# Patient Record
Sex: FEMALE | Race: WHITE | HISPANIC OR LATINO
[De-identification: names, ages, dates, MRNs, and addresses within clinical notes are randomized per-mention and may not be internally consistent; named-entity substitution may affect disease eponyms.]

---
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
## 2022-04-12 ENCOUNTER — TRANSCRIPTION ENCOUNTER (OUTPATIENT)
Age: 22
End: 2022-04-12

## 2022-12-29 ENCOUNTER — NON-APPOINTMENT (OUTPATIENT)
Age: 22
End: 2022-12-29

## 2023-03-03 ENCOUNTER — NON-APPOINTMENT (OUTPATIENT)
Age: 23
End: 2023-03-03

## 2023-04-21 PROBLEM — J45.909 UNSPECIFIED ASTHMA, UNCOMPLICATED: Chronic | Status: ACTIVE | Noted: 2020-07-30

## 2023-05-01 ENCOUNTER — NON-APPOINTMENT (OUTPATIENT)
Age: 23
End: 2023-05-01

## 2023-05-02 ENCOUNTER — APPOINTMENT (OUTPATIENT)
Dept: OTOLARYNGOLOGY | Facility: CLINIC | Age: 23
End: 2023-05-02
Payer: MEDICAID

## 2023-05-02 ENCOUNTER — NON-APPOINTMENT (OUTPATIENT)
Age: 23
End: 2023-05-02

## 2023-05-02 VITALS
SYSTOLIC BLOOD PRESSURE: 98 MMHG | DIASTOLIC BLOOD PRESSURE: 64 MMHG | HEIGHT: 61 IN | WEIGHT: 122 LBS | HEART RATE: 86 BPM | BODY MASS INDEX: 23.03 KG/M2

## 2023-05-02 PROCEDURE — 31231 NASAL ENDOSCOPY DX: CPT

## 2023-05-02 PROCEDURE — 99203 OFFICE O/P NEW LOW 30 MIN: CPT | Mod: 25

## 2023-05-02 RX ORDER — OMEPRAZOLE 20 MG/1
TABLET, ORALLY DISINTEGRATING, DELAYED RELEASE ORAL
Refills: 0 | Status: ACTIVE | COMMUNITY

## 2023-05-02 RX ORDER — FLUTICASONE PROPIONATE 50 UG/1
50 SPRAY, METERED NASAL TWICE DAILY
Qty: 1 | Refills: 1 | Status: ACTIVE | COMMUNITY
Start: 2023-05-02 | End: 1900-01-01

## 2023-05-02 NOTE — REASON FOR VISIT
[Initial Evaluation] : an initial evaluation for [FreeTextEntry2] : Allergies, nose pain and watery eyes

## 2023-05-02 NOTE — PHYSICAL EXAM
[Nasal Endoscopy Performed] : nasal endoscopy was performed, see procedure section for findings [de-identified] : Bilateral inferior turbinate hypertrophy [Midline] : trachea located in midline position [Normal] : no rashes

## 2023-05-02 NOTE — HISTORY OF PRESENT ILLNESS
[de-identified] : Carmela Velarde is a 23 yo female who presents for evaluation of recurrent URIs. This happened 5-6 times this year. This usually starts with throat pain and odynophagia, hoarseness of the voice, nasal congestion, yellow-green rhinorrhea, sinus pressure, and bilateral aural fullness. She denies otorrhea, tinnitus, or hearing loss. She notes sneezing. She is occasionally put on antibiotics for this. She currently has postnasal drainage, sinus pressure, clear rhinorrhea. She endorses headaches and bilateral aural fullness. She states that her hearing is normal. She denies fevers but notes chills last week. She had allergy testing as a child but not as an adult. She is using azelastine nasal spray.

## 2023-05-02 NOTE — ASSESSMENT
[FreeTextEntry1] : Carmela Velarde presents for evaluation. She has history of recurrent URIs, likely sinusitis, currently having an exacerbation at this time. She notes history of chronic rhinitis with positive allergy testing as a child. On sinonasal endoscopy, she has bilateral inferior turbinate hypertrohpy and left sided drainage. Will start maximal medical therapy for sinusitis and have her see allergy for evaluation.\par \par - Augmentin x 10 days. Side effects were discussed and include but are not limited to nausea, vomiting, diarrhea, and skin rash.\par - Nasal saline sprays BID x 3 weeks.\par - Fluticasone 2 sprays to each nostril BID x 3 weeks.\par - Azelastine 2 sprays to each nostril BID x 3 weeks.\par - Follow up in 3 weeks. Possible CT sinus depending on her symptoms.

## 2023-05-09 ENCOUNTER — EMERGENCY (EMERGENCY)
Facility: HOSPITAL | Age: 23
LOS: 1 days | Discharge: HOME | End: 2023-05-09
Attending: EMERGENCY MEDICINE
Payer: MEDICAID

## 2023-05-09 VITALS
RESPIRATION RATE: 18 BRPM | TEMPERATURE: 98 F | HEART RATE: 95 BPM | DIASTOLIC BLOOD PRESSURE: 77 MMHG | SYSTOLIC BLOOD PRESSURE: 112 MMHG | OXYGEN SATURATION: 99 %

## 2023-05-09 VITALS
HEART RATE: 91 BPM | OXYGEN SATURATION: 98 % | HEIGHT: 62 IN | WEIGHT: 119.93 LBS | TEMPERATURE: 98 F | RESPIRATION RATE: 18 BRPM | DIASTOLIC BLOOD PRESSURE: 75 MMHG | SYSTOLIC BLOOD PRESSURE: 114 MMHG

## 2023-05-09 LAB
RAPID RVP RESULT: SIGNIFICANT CHANGE UP
SARS-COV-2 RNA SPEC QL NAA+PROBE: SIGNIFICANT CHANGE UP

## 2023-05-09 PROCEDURE — 99283 EMERGENCY DEPT VISIT LOW MDM: CPT | Mod: 25

## 2023-05-09 PROCEDURE — 71046 X-RAY EXAM CHEST 2 VIEWS: CPT

## 2023-05-09 PROCEDURE — 99284 EMERGENCY DEPT VISIT MOD MDM: CPT

## 2023-05-09 PROCEDURE — 0225U NFCT DS DNA&RNA 21 SARSCOV2: CPT

## 2023-05-09 PROCEDURE — 94640 AIRWAY INHALATION TREATMENT: CPT

## 2023-05-09 PROCEDURE — 71046 X-RAY EXAM CHEST 2 VIEWS: CPT | Mod: 26

## 2023-05-09 RX ORDER — ACETAMINOPHEN 500 MG
650 TABLET ORAL ONCE
Refills: 0 | Status: COMPLETED | OUTPATIENT
Start: 2023-05-09 | End: 2023-05-09

## 2023-05-09 RX ORDER — IBUPROFEN 200 MG
600 TABLET ORAL ONCE
Refills: 0 | Status: COMPLETED | OUTPATIENT
Start: 2023-05-09 | End: 2023-05-09

## 2023-05-09 RX ORDER — IPRATROPIUM/ALBUTEROL SULFATE 18-103MCG
3 AEROSOL WITH ADAPTER (GRAM) INHALATION
Qty: 1 | Refills: 0
Start: 2023-05-09

## 2023-05-09 RX ORDER — IPRATROPIUM/ALBUTEROL SULFATE 18-103MCG
3 AEROSOL WITH ADAPTER (GRAM) INHALATION ONCE
Refills: 0 | Status: COMPLETED | OUTPATIENT
Start: 2023-05-09 | End: 2023-05-09

## 2023-05-09 RX ORDER — PHENYLEPHRINE HCL, BROMPHENIRAMINE MALEATE 5; 2 MG/10ML; MG/10ML
10 SOLUTION ORAL
Qty: 180 | Refills: 0
Start: 2023-05-09 | End: 2023-05-11

## 2023-05-09 RX ADMIN — Medication 650 MILLIGRAM(S): at 12:19

## 2023-05-09 RX ADMIN — Medication 3 MILLILITER(S): at 12:19

## 2023-05-09 RX ADMIN — Medication 600 MILLIGRAM(S): at 13:52

## 2023-05-09 NOTE — ED ADULT NURSE NOTE - OBJECTIVE STATEMENT
pt has a history of asthma and is here for cold like symptoms.  she has no history of fever and is afebrile here.  lungs are clear  she has used her rescue inhaler with no relief

## 2023-05-09 NOTE — ED PROVIDER NOTE - NS ED ATTENDING STATEMENT MOD
This was a shared visit with the EF[facility]IN. I reviewed and verified the documentation and independently performed the documented:

## 2023-05-09 NOTE — ED PROVIDER NOTE - PATIENT PORTAL LINK FT
You can access the FollowMyHealth Patient Portal offered by [facility] by registering at the following website: [email or web address]. By joining [employer]’s FollowMyHealth portal, you will also be able to view your health information using other applications (apps) compatible with our system.

## 2023-05-09 NOTE — ED PROVIDER NOTE - CLINICAL SUMMARY MEDICAL DECISION MAKING FREE TEXT BOX
Mario Henley MD, FACEP: In this physician's medical judgement based on clinical history and physical exam the patient's signs and symptoms lead to differential diagnoses which includes but is not limited to: uri, viral uri and cough    Historical features, symptoms, and clinical exam not consistent with: bacterial pneumonia, sepsis    Imaging was ordered and reviewed by me.      Appropriate medications for the patient's presenting complaints were ordered, and effects were reassessed.     Will follow up on labs, therapeutics, imaging, reassess and disposition as clinically indicated.  *The above represents an initial assessment/impression. Please refer to my progress notes below for potential changes in patient clinical course*     The patient was serially evaluated throughout emergency department course by the team. There was no acute deterioration up to this time in the emergency department. The patient has demonstrated clinical improvement and/or stability, feels better at this time according to emergency department team. Agree with goals/plan of emergency department care as described in this physician's electronic medical record, including diagnostics, therapeutics and consultation recommendation as clinically warranted. Will discharge home with close outpatient follow up with primary care physician/provider and specialist if necessary. The patient and/or family was educated on expectant management and return precautions concerning signs and features to return to the emergency department, in layman terms, including but not limited to: nausea, vomiting, fever, chills, the inability to eat, take medications, or drink, persistent/worsening symptoms or any concerns at all. There are no acute or immediate life threatening issues present on history, clinical exam, or any diagnostic evaluation. The patient is a safe disposition home, has capacity and insight into their condition, is ambulatory in the Emergency Department with no further questions and will follow up with their doctor(s) this week. Diagnosis, prognosis, natural history and treatment was discussed with patient and/or family. The patient and/or family were given the opportunity to ask questions and have them answered in full. The patient and/or family are with capacity and insight into the situation, treatment, risks, benefits, alternative therapies, and understand that they can ask any further questions if needed. Patient and/or family/guardian understands anticipatory guidance and was given strict return and follow up precautions. The patient and/or family/guardian has been informed of the necessity to follow up with the PMD/Clinic/follow up as provided within 2-3 days, and the patient and/or family/guardian reports understanding of above with capacity and insight. The patient and/or family/guardian were informed of any results of their tests and are were encouraged to follow up on the findings with their doctor as well as the need to inform their doctor of any results. The patient and/or family/guardian are aware of the need to follow up with repeat testing as applicable and report understanding of the above with capacity and insight. The patient and/or family/guardian was made aware of any pending test results at the time of discharge and of the need to call back for the final results as well as the need to inform their doctor of the results.

## 2023-05-09 NOTE — ED PROVIDER NOTE - OBJECTIVE STATEMENT
23 y/o female with PMHx of asthma presents to the ED complaining of wheezing and coughing x 4 days. Patient states that she has been having seasonal allergies for the past few days. She went to an ENT doctor and was given a nasal spray. She has been using as directed but has been having persistent symptoms. She complains of nasal congestion, cough, wheezing, chest tightness and dull headache. She took cold medicine this AM. No fevers at home. She denies any abdominal pain, n/v/d.

## 2023-05-09 NOTE — ED PROVIDER NOTE - PHYSICAL EXAMINATION
CONSTITUTIONAL: Patient is awake, alert and oriented x 3. Patient is well appearing and in no acute distress.  HEAD: NCAT  ENT: Airway patent, Nasal mucosa clear. Mouth with normal mucosa. Throat has no vesicles, no oropharyngeal exudates and uvula is midline.  NECK: Supple,   LUNGS: CTA B/L, no wheezes, rhonci or rales  HEART: RRR.+S1S2   ABDOMEN: Soft, non-tender to palpation throughout all four quadrants,   MSK: FROM upper and lower ext b/l,   SKIN: No rash or lesions  NEURO: No focal deficits,

## 2023-05-09 NOTE — ED PROVIDER NOTE - NSFOLLOWUPINSTRUCTIONS_ED_ALL_ED_FT
1. Follow up with your PCP within 2-3 days.   2. Rest.   3. Use inhaler as needed for wheezing/shortness of breath.   4. Take cough medicine as needed.   5. Take Ibuprofen (i.e. Motrin, Advil) 600mg every 8 hrs for pain as needed. Take with food.   May alternate with Acetminophen (Tylenol) 650mg every 6 hours for pain as needed.  6. Return to the emergency department if you develop worsening pain, difficulty breathing, fevers, vomiting, diarrhea or any other concerns.

## 2023-05-24 ENCOUNTER — NON-APPOINTMENT (OUTPATIENT)
Age: 23
End: 2023-05-24

## 2023-05-24 ENCOUNTER — APPOINTMENT (OUTPATIENT)
Dept: PEDIATRIC ALLERGY IMMUNOLOGY | Facility: CLINIC | Age: 23
End: 2023-05-24
Payer: MEDICAID

## 2023-05-24 VITALS
WEIGHT: 124 LBS | DIASTOLIC BLOOD PRESSURE: 70 MMHG | OXYGEN SATURATION: 98 % | BODY MASS INDEX: 22.82 KG/M2 | HEIGHT: 62 IN | SYSTOLIC BLOOD PRESSURE: 106 MMHG | HEART RATE: 78 BPM

## 2023-05-24 DIAGNOSIS — K21.9 GASTRO-ESOPHAGEAL REFLUX DISEASE W/OUT ESOPHAGITIS: ICD-10-CM

## 2023-05-24 PROCEDURE — 94375 RESPIRATORY FLOW VOLUME LOOP: CPT

## 2023-05-24 PROCEDURE — 99204 OFFICE O/P NEW MOD 45 MIN: CPT | Mod: 25

## 2023-05-24 PROCEDURE — 95004 PERQ TESTS W/ALRGNC XTRCS: CPT

## 2023-05-24 RX ORDER — ISONIAZID 300 MG/1
300 TABLET ORAL DAILY
Qty: 1 | Refills: 0 | Status: DISCONTINUED | COMMUNITY
Start: 2019-09-24 | End: 2023-05-24

## 2023-05-24 RX ORDER — CETIRIZINE HCL 10 MG
10 TABLET ORAL
Refills: 0 | Status: ACTIVE | COMMUNITY

## 2023-05-24 RX ORDER — ALBUTEROL SULFATE 2.5 MG/3ML
(2.5 MG/3ML) SOLUTION RESPIRATORY (INHALATION)
Refills: 0 | Status: ACTIVE | COMMUNITY

## 2023-05-24 RX ORDER — ESOMEPRAZOLE MAGNESIUM 20 MG/1
20 CAPSULE, DELAYED RELEASE ORAL DAILY
Qty: 30 | Refills: 3 | Status: DISCONTINUED | COMMUNITY
Start: 2019-09-24 | End: 2023-05-24

## 2023-05-24 RX ORDER — BUDESONIDE AND FORMOTEROL FUMARATE DIHYDRATE 160; 4.5 UG/1; UG/1
160-4.5 AEROSOL RESPIRATORY (INHALATION) TWICE DAILY
Qty: 1 | Refills: 2 | Status: ACTIVE | COMMUNITY
Start: 2023-05-24 | End: 1900-01-01

## 2023-05-24 RX ORDER — FAMOTIDINE 20 MG/1
20 TABLET, FILM COATED ORAL
Qty: 1 | Refills: 6 | Status: DISCONTINUED | COMMUNITY
Start: 2019-09-24 | End: 2023-05-24

## 2023-05-24 RX ORDER — AMOXICILLIN AND CLAVULANATE POTASSIUM 875; 125 MG/1; MG/1
875-125 TABLET, COATED ORAL
Qty: 20 | Refills: 0 | Status: DISCONTINUED | COMMUNITY
Start: 2023-05-02 | End: 2023-05-24

## 2023-05-24 RX ORDER — FAMOTIDINE 20 MG/1
20 TABLET, FILM COATED ORAL
Refills: 0 | Status: ACTIVE | COMMUNITY

## 2023-05-24 RX ORDER — ALBUTEROL SULFATE 90 UG/1
108 (90 BASE) INHALANT RESPIRATORY (INHALATION)
Refills: 0 | Status: ACTIVE | COMMUNITY

## 2023-05-24 NOTE — REVIEW OF SYSTEMS
[Rhinorrhea] : rhinorrhea [Nasal Congestion] : nasal congestion [Post Nasal Drip] : post nasal drip [Difficulty Breathing] : dyspnea [SOB at Rest] : shortness of breath at rest [SOB with Exertion] : dyspnea on exertion [Heartburn] : heartburn [Recurrent Bronchitis] : recurrent bronchitis [Recurrent Ear Infections] : recurrent ear infections [Nl] : Integumentary [Immunizations are up to date] : Immunizations are up to date [Recurrent Throat Infections] : no recurrence of throat infections [Recurrent Skin Infections] : no recurrent skin infections [Recurrent Pneumonia] : no ~T recurrent pneumonia

## 2023-05-24 NOTE — PHYSICAL EXAM
[Alert] : alert [Well Nourished] : well nourished [Healthy Appearance] : healthy appearance [No Acute Distress] : no acute distress [Well Developed] : well developed [Normal Voice/Communication] : normal voice communication [Normal Pupil & Iris Size/Symmetry] : normal pupil and iris size and symmetry [No Discharge] : no discharge [No Photophobia] : no photophobia [Sclera Not Icteric] : sclera not icteric [Normal TMs] : both tympanic membranes were normal [Normal Nasal Mucosa] : the nasal mucosa was normal [Normal Lips/Tongue] : the lips and tongue were normal [Normal Outer Ear/Nose] : the ears and nose were normal in appearance [No Nasal Discharge] : no nasal discharge [Normal Tonsils] : normal tonsils [No Thrush] : no thrush [No Neck Mass] : no neck mass was observed [Normal Rate and Effort] : normal respiratory rhythm and effort [Bilateral Audible Breath Sounds] : bilateral audible breath sounds [Normal Rate] : heart rate was normal  [Normal Cervical Lymph Nodes] : cervical [Skin Intact] : skin intact  [No Rash] : no rash [Normal Mood] : mood was normal [Judgment and Insight Age Appropriate] : judgement and insight is age appropriate [Alert, Awake, Oriented as Age-Appropriate] : alert, awake, oriented as age appropriate

## 2023-05-24 NOTE — REASON FOR VISIT
[Initial Consultation] : an initial consultation for [Allergy Evaluation/ Skin Testing] : allergy evaluation and or skin testing [Congestion] : congestion [Asthma] : asthma [Recurrent Infect] : recurrent infections [FreeTextEntry3] : post nasal drip

## 2023-05-24 NOTE — IMPRESSION
[_____] : cat ([unfilled]) [Allergy Testing Mixed Feathers] : feathers [Allergy Testing Cockroach] : cockroach [Allergy Testing Dog] : dog [] : molds [Allergy Testing Trees] : trees [Allergy Testing Weeds] : weeds [Allergy Testing Grasses] : grasses [________] : [unfilled] [Spirometry] : Spirometry [Normal Spirometry] : spirometry normal [FreeTextEntry1] : Normal

## 2023-05-24 NOTE — ASSESSMENT
[FreeTextEntry1] : 22 y.o female with hx of allergies and asthma presents with recurrent infections and uncontrolled asthma\par \par Skin test today shows: large positive to DM DF/DP and borderline to cat\par \par Spirometry today shows: normal\par \par Allergic rhinitis\par - Continue Azelastine 137 mcg 1-2 spray BID\par - Continue Flonase 50mcg 2 sprays QD\par - Continue Zyrtec 10mg 1 tab PO QD\par - Can consider Singular is still symptomatic next visit\par \par Asthma\par - Start Symbicort 160 2 puffs BID\par - Continue Albuterol 2 puffs q4-6hrs PRN\par - Continue Ipratropium via neb PRN\par \par Recurrent infections\par - Immune workup sent\par \par

## 2023-05-24 NOTE — CONSULT LETTER
[Dear  ___] : Dear  [unfilled], [Consult Letter:] : I had the pleasure of evaluating your patient, [unfilled]. [Please see my note below.] : Please see my note below. [Consult Closing:] : Thank you very much for allowing me to participate in the care of this patient.  If you have any questions, please do not hesitate to contact me. [Sincerely,] : Sincerely, [DrEliane  ___] : Dr. ANGULO [FreeTextEntry3] : Delphine HERNANDEZ\par

## 2023-05-24 NOTE — HISTORY OF PRESENT ILLNESS
[de-identified] : 22 y.o female presents for evaluation. Patient has dealt with allergies since young child allen. She claims initially her allergies would occur during the spring. However, now over past 2-3 years she's had symptoms year round. She calls these symptoms colds and they been  recurrent (5-6 past yr). She's had recurrent episodes of bronchitis and AOM but denies any pneumonia.  Some of her URI symptoms include sore throat, hoarseness of the voice, nasal congestion, yellow-green rhinorrhea, sinus pressure, sneezing, PND, sinus pressure and bilateral ear fullness. She also gets fevers and flares of her asthma. She is only symptom free for 1-2 months max and then gets ill again. On some occasions antibiotics are used. She also self treats with OTC antihistamines like Zyrtec and azelastine nasal spray which don’t provide much relief. Allergy testing has been previously done when she was about 6 and she remembers it was positive. \par \par As for her asthma its been more active over past year with chest tightness SOB and wheezing. Symptoms are worse when she lies flat at night so she has been sleeping propped. Her asthma is flared by allergies, URI and exertion. About 3 weeks ago she had a flare and was seen in ED where they prescribed nebulized ipratropium. She is using albuterol up to 3 times a day. he also admits to reflux and has been on famotidine 20mg at night for past 2 weeks which has helped.\par \par Patient seen by ENT Dr. Shashi Hutchinson and on 5/2/23 sinonasal endoscopy showed bilateral inferior turbinate hypertrophy and left sided drainage. Patient started on Augmentin x 10 days, Nasal saline sprays BID x 3 weeks, Fluticasone 2 sprays to each nostril BID x 3 weeks and asked to continue Azelastine 2 sprays to each nostril BID x 3 weeks. This regimen of medication has helped her nasal symptoms significantly. \par

## 2023-05-24 NOTE — SOCIAL HISTORY
[House] : [unfilled] lives in a house  [Radiator/Baseboard] : heating provided by radiator(s)/baseboard(s) [Central] : air conditioning provided by central unit [Humidifier] : uses a humidifier [Dust Mite Covers] : has dust mite covers [Dog] : dog [Bedroom] : not in the bedroom [Basement] : not in the basement [Living Area] : not in the living area [Smokers in Household] : there are no smokers in the home

## 2023-06-01 ENCOUNTER — APPOINTMENT (OUTPATIENT)
Dept: OTOLARYNGOLOGY | Facility: CLINIC | Age: 23
End: 2023-06-01
Payer: MEDICAID

## 2023-06-01 VITALS
HEIGHT: 62 IN | HEART RATE: 89 BPM | SYSTOLIC BLOOD PRESSURE: 113 MMHG | WEIGHT: 124 LBS | DIASTOLIC BLOOD PRESSURE: 71 MMHG | BODY MASS INDEX: 22.82 KG/M2

## 2023-06-01 PROCEDURE — 99213 OFFICE O/P EST LOW 20 MIN: CPT

## 2023-06-01 NOTE — HISTORY OF PRESENT ILLNESS
[de-identified] : Carmela Velarde is a 21 yo female who presents for evaluation of recurrent URIs. This happened 5-6 times this year. This usually starts with throat pain and odynophagia, hoarseness of the voice, nasal congestion, yellow-green rhinorrhea, sinus pressure, and bilateral aural fullness. She denies otorrhea, tinnitus, or hearing loss. She notes sneezing. She is occasionally put on antibiotics for this. She currently has postnasal drainage, sinus pressure, clear rhinorrhea. She endorses headaches and bilateral aural fullness. She states that her hearing is normal. She denies fevers but notes chills last week. She had allergy testing as a child but not as an adult. She is using azelastine nasal spray. [FreeTextEntry1] : 6/1/23 - Carmela Velarde presents for follow-up. She completed maximal medical therapy with augmentin, saline sprays, fluticasone, and azelastine. She had allergy testing postiive for dust and cats. She was started on symbicort by allergy. She denies nasal congestion, rhinorrhea, postnasal drainage, or sinus pressure. She notes intermittent aural fullness. She notes that hearing is normal. She denies fevers, chills, vision changes, or pain/restriction of extraocular movements.

## 2023-06-01 NOTE — PHYSICAL EXAM
[de-identified] : Bilateral inferior turbinate hypertrophy [Midline] : trachea located in midline position [Normal] : no rashes

## 2023-06-01 NOTE — ASSESSMENT
[FreeTextEntry1] : Carmela Velarde presents for evaluation. She has history of chronic recurrent sinusitis. She has chronic rhinitis and tested positive for dust and cats on allergy testing. On previous sinonasal endoscopy, she had bilateral inferior turbinate hypertrophy and left sided drainage. She completed maximal medical therapy with augmentin ,saline sprays, fluticasone, and azelastine. We will obtain CT Sinus given her recurrent sinusitis. She will continue her topical nasal regimen. \par \par - Continue nasal saline sprays BID\par - Continue fluticasone 2 sprays to each nostril BID\par - Continue azelastine 2 sprays to each nostril BID\par - CT sinus\par - Follow up after CT

## 2023-06-12 ENCOUNTER — APPOINTMENT (OUTPATIENT)
Dept: CT IMAGING | Facility: CLINIC | Age: 23
End: 2023-06-12
Payer: MEDICAID

## 2023-06-12 PROCEDURE — 70486 CT MAXILLOFACIAL W/O DYE: CPT

## 2023-07-13 ENCOUNTER — LABORATORY RESULT (OUTPATIENT)
Age: 23
End: 2023-07-13

## 2023-07-13 ENCOUNTER — APPOINTMENT (OUTPATIENT)
Dept: OTOLARYNGOLOGY | Facility: CLINIC | Age: 23
End: 2023-07-13

## 2023-07-13 PROBLEM — J45.909 UNSPECIFIED ASTHMA, UNCOMPLICATED: Chronic | Status: ACTIVE | Noted: 2023-05-09

## 2023-07-17 LAB
A ALTERNATA IGE QN: <0.1 KUA/L
A FUMIGATUS IGE QN: <0.1 KUA/L
BERMUDA GRASS IGE QN: <0.1 KUA/L
BOXELDER IGE QN: <0.1 KUA/L
C HERBARUM IGE QN: <0.1 KUA/L
CALIF WALNUT IGE QN: <0.1 KUA/L
CAT DANDER IGE QN: 2.66 KUA/L
CD16+CD56+ CELLS # BLD: 255 CELLS/UL
CD16+CD56+ CELLS NFR BLD: 9 %
CD19 CELLS NFR BLD: 238 CELLS/UL
CD3 CELLS # BLD: 2167 CELLS/UL
CD3 CELLS NFR BLD: 80 %
CD3+CD4+ CELLS # BLD: 989 CELLS/UL
CD3+CD4+ CELLS NFR BLD: 36 %
CD3+CD4+ CELLS/CD3+CD8+ CLL SPEC: 0.89 RATIO
CD3+CD8+ CELLS # SPEC: 1110 CELLS/UL
CD3+CD8+ CELLS NFR BLD: 40 %
CELLS.CD3-CD19+/CELLS IN BLOOD: 9 %
CMN PIGWEED IGE QN: <0.1 KUA/L
COMMON RAGWEED IGE QN: <0.1 KUA/L
COTTONWOOD IGE QN: <0.1 KUA/L
D FARINAE IGE QN: 27.6 KUA/L
D PTERONYSS IGE QN: 19.6 KUA/L
DEPRECATED A ALTERNATA IGE RAST QL: 0
DEPRECATED A FUMIGATUS IGE RAST QL: 0
DEPRECATED BERMUDA GRASS IGE RAST QL: 0
DEPRECATED BOXELDER IGE RAST QL: 0
DEPRECATED C HERBARUM IGE RAST QL: 0
DEPRECATED CAT DANDER IGE RAST QL: 2
DEPRECATED COMMON PIGWEED IGE RAST QL: 0
DEPRECATED COMMON RAGWEED IGE RAST QL: 0
DEPRECATED COTTONWOOD IGE RAST QL: 0
DEPRECATED D FARINAE IGE RAST QL: 4
DEPRECATED D PTERONYSS IGE RAST QL: 4
DEPRECATED DOG DANDER IGE RAST QL: 1
DEPRECATED GOOSEFOOT IGE RAST QL: 0
DEPRECATED KAPPA LC FREE/LAMBDA SER: 1.52 RATIO
DEPRECATED LONDON PLANE IGE RAST QL: 0
DEPRECATED MOUSE URINE PROT IGE RAST QL: 0
DEPRECATED MUGWORT IGE RAST QL: 0
DEPRECATED P NOTATUM IGE RAST QL: 0
DEPRECATED RED CEDAR IGE RAST QL: 0
DEPRECATED ROACH IGE RAST QL: 0
DEPRECATED SHEEP SORREL IGE RAST QL: 0
DEPRECATED SILVER BIRCH IGE RAST QL: 0
DEPRECATED TIMOTHY IGE RAST QL: 0
DEPRECATED WHITE ASH IGE RAST QL: 0
DEPRECATED WHITE OAK IGE RAST QL: 0
DOG DANDER IGE QN: 0.38 KUA/L
GOOSEFOOT IGE QN: <0.1 KUA/L
IGA SER QL IEP: 210 MG/DL
IGG SER QL IEP: 1135 MG/DL
IGM SER QL IEP: 84 MG/DL
KAPPA LC CSF-MCNC: 0.94 MG/DL
KAPPA LC SERPL-MCNC: 1.43 MG/DL
LONDON PLANE IGE QN: <0.1 KUA/L
MOUSE URINE PROT IGE QN: <0.1 KUA/L
MUGWORT IGE QN: <0.1 KUA/L
MULBERRY (T70) CLASS: 0
MULBERRY (T70) CONC: <0.1 KUA/L
P NOTATUM IGE QN: <0.1 KUA/L
RED CEDAR IGE QN: <0.1 KUA/L
ROACH IGE QN: <0.1 KUA/L
SHEEP SORREL IGE QN: <0.1 KUA/L
SILVER BIRCH IGE QN: <0.1 KUA/L
TIMOTHY IGE QN: <0.1 KUA/L
TOTAL IGE SMQN RAST: 148 KU/L
TREE ALLERG MIX1 IGE QL: 0
WHITE ASH IGE QN: <0.1 KUA/L
WHITE ELM IGE QN: 0.11 KUA/L
WHITE ELM IGE QN: NORMAL
WHITE OAK IGE QN: <0.1 KUA/L

## 2023-07-18 LAB — C TETANI IGG SER-ACNC: 0.22 IU/ML

## 2023-07-20 ENCOUNTER — APPOINTMENT (OUTPATIENT)
Dept: PEDIATRIC ALLERGY IMMUNOLOGY | Facility: CLINIC | Age: 23
End: 2023-07-20
Payer: MEDICAID

## 2023-07-20 VITALS
HEART RATE: 84 BPM | DIASTOLIC BLOOD PRESSURE: 69 MMHG | WEIGHT: 120 LBS | OXYGEN SATURATION: 99 % | SYSTOLIC BLOOD PRESSURE: 103 MMHG

## 2023-07-20 DIAGNOSIS — J32.9 CHRONIC SINUSITIS, UNSPECIFIED: ICD-10-CM

## 2023-07-20 DIAGNOSIS — J45.40 MODERATE PERSISTENT ASTHMA, UNCOMPLICATED: ICD-10-CM

## 2023-07-20 DIAGNOSIS — B99.9 UNSPECIFIED INFECTIOUS DISEASE: ICD-10-CM

## 2023-07-20 DIAGNOSIS — J30.89 OTHER ALLERGIC RHINITIS: ICD-10-CM

## 2023-07-20 PROCEDURE — 99213 OFFICE O/P EST LOW 20 MIN: CPT

## 2023-07-20 RX ORDER — AZELASTINE HYDROCHLORIDE 137 UG/1
SPRAY, METERED NASAL
Refills: 0 | Status: COMPLETED | COMMUNITY
End: 2023-07-20

## 2023-07-20 RX ORDER — IPRATROPIUM BROMIDE 42 UG/1
SPRAY NASAL
Refills: 0 | Status: COMPLETED | COMMUNITY
End: 2023-07-20

## 2023-07-20 NOTE — CONSULT LETTER
[Dear  ___] : Dear  [unfilled], [Courtesy Letter:] : I had the pleasure of seeing your patient, [unfilled], in my office today. [Please see my note below.] : Please see my note below. [Sincerely,] : Sincerely, [FreeTextEntry3] : Delphine HERNANDEZ\par

## 2023-07-20 NOTE — HISTORY OF PRESENT ILLNESS
[de-identified] : 22 y.o female last seen 5/24/23. Patient has dealt with allergies since young child allen initially in the spring but now year round. She calls these symptoms colds and they been recurrent (5-6 past yr). She's had recurrent episodes of bronchitis and AOM but denies any pneumonia. Some of her URI symptoms include sore throat, hoarseness of the voice, nasal congestion, yellow-green rhinorrhea, sinus pressure, sneezing, PND, sinus pressure and bilateral ear fullness. She also gets fevers and flares of her asthma. She is only symptom free for 1-2 months max and then gets ill again. On some occasions antibiotics are used. She also self treats with OTC antihistamines like Zyrtec and azelastine nasal spray which don’t provide much relief. Allergy testing has been previously done when she was about 6 and she remembers it was positive. Immune workup sent\par \par She also has asthma which had been more active over past year with chest tightness SOB and wheezing. Symptoms worse when lying flat, with allergen exposure, URI and exertion. In early May she had a flare and was seen in ED where they prescribed nebulized ipratropium. She continued using albuterol up to 3 times a day. Also admits to reflux and has been on famotidine 20mg at night for past 2 weeks which has helped. Symbicort 160 2p BID added last visit.\par \par Patient seen by ENT Dr. Shashi Hutchinson and on 5/2/23 sinonasal endoscopy showed bilateral inferior turbinate hypertrophy and left sided drainage. Patient started on Augmentin x 10 days, Nasal saline sprays BID x 3 weeks, Fluticasone 2 sprays to each nostril BID x 3 weeks and asked to continue Azelastine 2 sprays to each nostril BID x 3 weeks. This regimen of medication has helped her nasal symptoms significantly and she was asked to remain on it. CT sinus 6/12/23 showed clear sinuses and narrowed OMUs bilaterally due to Agger nasi cells \par \par Skin test 5/24/23 shows: large positive to DM DF/DP and borderline to cat\par \par Spirometry 5/2023 showed: normal\par \par Lab work 7/14/23\par CBC- overall normal\par Full T-cell subset panel- high CD3, CD8- likely reactive\par Immunoglobulins- all normal\par Aeroallergen ImmunoCAP- larger positives to DM and cat. Tiny positive to dog\par Pending pneumococcal titers\par \par Patient now back and doing very well. Allergies well controlled with use of Flonase 50mcg 2 sprays QD. Zyrtec 10mg also used 2-3 times a weeks. Azelastine D/c as felt bad taste and not symptomatic enough to use. Her asthma also well controlled with no SOB, chest tightness or wheezing since addition of Symbicort 160 2 puffs BID. No acute infections since last visit. Asthma typically flares in fall and winter.

## 2023-07-20 NOTE — ASSESSMENT
[FreeTextEntry1] : 22 y.o female with hx of allergies, asthma presents and recurrent infections presents doing well with consistent use of Symbicort 160 2 puffs BID and Flonase 50mcg 2 sprays QD.\par \par Lab work reviewed and normal immune function but pending pneumococcal titers.Will call with final results. \par  No evidence of pollen allergy which pt suspected. can consider intradermals in the future if strongly considering AIT.\par \par Suggest:\par - Continue Symbicort 160 2 puffs BID\par - Continue Albuterol 2 puffs q4-6hrs PRN\par - Continue Flonase 50mcg 2 sprays QD\par - Continue Zyrtec 10mg 1 tab PO PRN\par \par Follow-up in 3 months. Can repeat PFT at that time \par

## 2023-07-27 ENCOUNTER — NON-APPOINTMENT (OUTPATIENT)
Age: 23
End: 2023-07-27

## 2023-07-27 LAB
DEPRECATED S PNEUM 1 IGG SER-MCNC: 23 MCG/ML
DEPRECATED S PNEUM12 AB SER-ACNC: 0.5 MCG/ML
DEPRECATED S PNEUM14 AB SER-ACNC: 4.9 MCG/ML
DEPRECATED S PNEUM17 IGG SER IA-MCNC: 14.5 MCG/ML
DEPRECATED S PNEUM18 IGG SER IA-MCNC: 1.2 MCG/ML
DEPRECATED S PNEUM19 IGG SER-MCNC: 15.2 MCG/ML
DEPRECATED S PNEUM19 IGG SER-MCNC: 36.7 MCG/ML
DEPRECATED S PNEUM2 IGG SER-MCNC: 2.9 MCG/ML
DEPRECATED S PNEUM20 IGG SER-MCNC: 1.4 MCG/ML
DEPRECATED S PNEUM22 IGG SER-MCNC: 60.2 MCG/ML
DEPRECATED S PNEUM23 AB SER-ACNC: 88.1 MCG/ML
DEPRECATED S PNEUM3 AB SER-ACNC: 6.3 MCG/ML
DEPRECATED S PNEUM34 IGG SER-MCNC: 12.9 MCG/ML
DEPRECATED S PNEUM4 AB SER-ACNC: 1.2 MCG/ML
DEPRECATED S PNEUM5 IGG SER-MCNC: 70.6 MCG/ML
DEPRECATED S PNEUM6 IGG SER-MCNC: 5.6 MCG/ML
DEPRECATED S PNEUM7 IGG SER-ACNC: 14.1 MCG/ML
DEPRECATED S PNEUM8 AB SER-ACNC: 8 MCG/ML
DEPRECATED S PNEUM9 AB SER-ACNC: NORMAL MCG/ML
DEPRECATED S PNEUM9 IGG SER-MCNC: 6.8 MCG/ML
STREPTOCOCCUS PNEUMONIAE SEROTYPE 11A: 14 MCG/ML
STREPTOCOCCUS PNEUMONIAE SEROTYPE 15B: 6.6 MCG/ML
STREPTOCOCCUS PNEUMONIAE SEROTYPE 33F: 4.5 MCG/ML

## 2023-08-10 ENCOUNTER — APPOINTMENT (OUTPATIENT)
Dept: OTOLARYNGOLOGY | Facility: CLINIC | Age: 23
End: 2023-08-10
Payer: MEDICAID

## 2023-08-10 VITALS
SYSTOLIC BLOOD PRESSURE: 107 MMHG | WEIGHT: 124 LBS | HEIGHT: 62 IN | HEART RATE: 89 BPM | BODY MASS INDEX: 22.82 KG/M2 | DIASTOLIC BLOOD PRESSURE: 67 MMHG

## 2023-08-10 DIAGNOSIS — J34.89 OTHER SPECIFIED DISORDERS OF NOSE AND NASAL SINUSES: ICD-10-CM

## 2023-08-10 DIAGNOSIS — J34.3 HYPERTROPHY OF NASAL TURBINATES: ICD-10-CM

## 2023-08-10 PROCEDURE — 99214 OFFICE O/P EST MOD 30 MIN: CPT

## 2023-08-10 NOTE — DATA REVIEWED
[de-identified] : CT sinus: FINDINGS:  FRONTAL SINUSES: Clear. ANTERIOR ETHMOIDS: Clear. POSTERIOR ETHMOIDS: Clear. SPHENOID SINUSES: Clear. MAXILLARY SINUSES: Trace mucosal thickening along the floor the maxillary sinuses bilaterally. OSTIOMEATAL UNITS: Narrowed bilaterally due to Agger nasi cells. NASAL CAVITY: Clear. MASTOID AIR CELLS: Clear.  ANATOMIC VARIANTS: Bilateral Agger nasi cells. No Onodi or infraorbital Mica cell is seen. Carotid canals, optic canals, and anterior ethmoid impressions are intact. 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 Ethmoid roofs and cribriform plates are symmetric.  SOFT TISSUES: Unremarkable appearance of the visualized brain parenchyma, pharyngeal and infratemporal fossa soft tissues.  IMPRESSION:  No fluid level to indicate acute sinusitis.  Narrowed OMUs bilaterally due to Agger nasi cells.

## 2023-08-10 NOTE — ASSESSMENT
[FreeTextEntry1] : Carmela Velarde presents for follow-up. She has history of chronic recurrent sinusitis. She has chronic rhinitis and tested positive for dust and cats on allergy testing. On previous sinonasal endoscopy, she had bilateral inferior turbinate hypertrophy and left sided drainage. She completed maximal medical therapy with augmentin ,saline sprays, fluticasone, and azelastine. She obtained CT sinus which was reviewed today. This showed straight septum and no evidence of significant sinus disease. She has a chronic right lamina papyracea fracture but has no visions changes or extraocular movement restrictions. She is unsure of when this could have occurred.  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 We discussed in office Vivaer radiofrequency procedure for bilateral internal nasal valve repair and inferior turbinate reduction. Risks, benefits, and alternatives of procedure were discussed. Risks include but are not limited to bleeding, infection, scarring, injury to skin/mucosa, failure to alleviate problem, and need for further procedures/surgery. All questions were answered.  - Continue nasal saline sprays BID - Continue fluticasone 2 sprays to each nostril BID - Continue azelastine 2 sprays to each nostril BID - Follow up in office for procedure.

## 2023-08-10 NOTE — HISTORY OF PRESENT ILLNESS
[de-identified] : Carmela Velarde is a 21 yo female who presents for evaluation of recurrent URIs. This happened 5-6 times this year. This usually starts with throat pain and odynophagia, hoarseness of the voice, nasal congestion, yellow-green rhinorrhea, sinus pressure, and bilateral aural fullness. She denies otorrhea, tinnitus, or hearing loss. She notes sneezing. She is occasionally put on antibiotics for this. She currently has postnasal drainage, sinus pressure, clear rhinorrhea. She endorses headaches and bilateral aural fullness. She states that her hearing is normal. She denies fevers but notes chills last week. She had allergy testing as a child but not as an adult. She is using azelastine nasal spray. [FreeTextEntry1] : 6/1/23 - Carmela Velarde presents for follow-up. She completed maximal medical therapy with augmentin, saline sprays, fluticasone, and azelastine. She had allergy testing postiive for dust and cats. She was started on symbicort by allergy. She denies nasal congestion, rhinorrhea, postnasal drainage, or sinus pressure. She notes intermittent aural fullness. She notes that hearing is normal. She denies fevers, chills, vision changes, or pain/restriction of extraocular movements.  8/10/23 - Carmela presents for follow-up. She is using fluticasone nasal sprays and symbicort. She notes some nasal congestion and postnasal drainage. She denies rhinorrhea or sinus pressure. No fevers or chills. She denies vision changes or pain/restriction of extraocular movements.

## 2023-08-10 NOTE — PHYSICAL EXAM
[Midline] : trachea located in midline position [Normal] : no rashes [de-identified] : Bilateral nasal valve collapse with positive ita maneuver bilaterally [de-identified] : Bilateral inferior turbinate hypertrophy

## 2023-08-16 ENCOUNTER — NON-APPOINTMENT (OUTPATIENT)
Age: 23
End: 2023-08-16

## 2023-10-10 ENCOUNTER — APPOINTMENT (OUTPATIENT)
Dept: PEDIATRIC ALLERGY IMMUNOLOGY | Facility: CLINIC | Age: 23
End: 2023-10-10

## 2024-01-15 ENCOUNTER — NON-APPOINTMENT (OUTPATIENT)
Age: 24
End: 2024-01-15

## 2024-03-19 ENCOUNTER — NON-APPOINTMENT (OUTPATIENT)
Age: 24
End: 2024-03-19

## 2024-05-31 ENCOUNTER — APPOINTMENT (OUTPATIENT)
Dept: OTOLARYNGOLOGY | Facility: CLINIC | Age: 24
End: 2024-05-31
Payer: MEDICAID

## 2024-05-31 VITALS
WEIGHT: 130 LBS | SYSTOLIC BLOOD PRESSURE: 103 MMHG | HEIGHT: 62 IN | BODY MASS INDEX: 23.92 KG/M2 | DIASTOLIC BLOOD PRESSURE: 69 MMHG | HEART RATE: 89 BPM

## 2024-05-31 DIAGNOSIS — R49.0 DYSPHONIA: ICD-10-CM

## 2024-05-31 DIAGNOSIS — M95.0 ACQUIRED DEFORMITY OF NOSE: ICD-10-CM

## 2024-05-31 DIAGNOSIS — J31.0 CHRONIC RHINITIS: ICD-10-CM

## 2024-05-31 DIAGNOSIS — K21.9 GASTRO-ESOPHAGEAL REFLUX DISEASE W/OUT ESOPHAGITIS: ICD-10-CM

## 2024-05-31 PROCEDURE — 31575 DIAGNOSTIC LARYNGOSCOPY: CPT

## 2024-05-31 PROCEDURE — 99213 OFFICE O/P EST LOW 20 MIN: CPT | Mod: 25

## 2024-05-31 NOTE — PHYSICAL EXAM
[de-identified] : Bilateral nasal valve collapse with positive ita maneuver bilaterally [de-identified] : Bilateral inferior turbinate hypertrophy [Midline] : trachea located in midline position [Laryngoscopy Performed] : laryngoscopy was performed, see procedure section for findings [Normal] : no rashes

## 2024-05-31 NOTE — HISTORY OF PRESENT ILLNESS
[de-identified] : Carmela Velarde is a 23 yo female who presents for evaluation of recurrent URIs. This happened 5-6 times this year. This usually starts with throat pain and odynophagia, hoarseness of the voice, nasal congestion, yellow-green rhinorrhea, sinus pressure, and bilateral aural fullness. She denies otorrhea, tinnitus, or hearing loss. She notes sneezing. She is occasionally put on antibiotics for this. She currently has postnasal drainage, sinus pressure, clear rhinorrhea. She endorses headaches and bilateral aural fullness. She states that her hearing is normal. She denies fevers but notes chills last week. She had allergy testing as a child but not as an adult. She is using azelastine nasal spray. [FreeTextEntry1] : 6/1/23 - Carmela Velarde presents for follow-up. She completed maximal medical therapy with augmentin, saline sprays, fluticasone, and azelastine. She had allergy testing postiive for dust and cats. She was started on symbicort by allergy. She denies nasal congestion, rhinorrhea, postnasal drainage, or sinus pressure. She notes intermittent aural fullness. She notes that hearing is normal. She denies fevers, chills, vision changes, or pain/restriction of extraocular movements.  8/10/23 - Carmela presents for follow-up. She is using fluticasone nasal sprays and symbicort. She notes some nasal congestion and postnasal drainage. She denies rhinorrhea or sinus pressure. No fevers or chills. She denies vision changes or pain/restriction of extraocular movements.  5/31/24 - Carmela Velarde presents for follow-up. She has been using nasal sprays intermittently. She denies nasal congestion, sinus pressure, postnasal drainage, or rhinorrhea. In 3/2024, she developed sore throat and odynophagia. Strep test was negative but she was treated with antibiotics which helped. Since then, she has intermittent raspiness of voice. She denies dyspnea, sore throat, dysphagia, odynophagia, aspiration episodes. She notes intermittent heartburn. She denies recent fevers or chills. She takes famotidine as needed.

## 2024-05-31 NOTE — ASSESSMENT
[FreeTextEntry1] : Carmela Velarde presents for follow-up. She has history of chronic recurrent sinusitis. She has chronic rhinitis and tested positive for dust and cats on allergy testing. On previous sinonasal endoscopy, she had bilateral inferior turbinate hypertrophy and left sided drainage. She completed maximal medical therapy with augmentin ,saline sprays, fluticasone, and azelastine. She obtained CT sinus which was reviewed at previous visit This showed straight septum and no evidence of significant sinus disease. She has a chronic right lamina papyracea fracture but has no visions changes or extraocular movement restrictions. She is unsure of when this could have occurred. 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 We discussed in office Vivaer radiofrequency procedure for bilateral internal nasal valve repair and inferior turbinate reduction at previous visit but she deferred for now. Chronic rhinitis symptoms are improved when using topical nasal sprays as needed. She notes intermittent dysphonia since throat infection two months ago. Flexible laryngoscopy was performed revealing moderate postcricoid inflammation indicating laryngopharyngeal reflux. We discussed antireflux precautions and handout was provided.  - Continue fluticasone 2 sprays to each nostril BID prn - Continue azelastine 2 sprays to each nostril BID prn - Start antireflux precautions. - f/u in 3 mo.

## 2024-09-06 ENCOUNTER — APPOINTMENT (OUTPATIENT)
Dept: OTOLARYNGOLOGY | Facility: CLINIC | Age: 24
End: 2024-09-06

## 2024-09-29 ENCOUNTER — NON-APPOINTMENT (OUTPATIENT)
Age: 24
End: 2024-09-29

## 2024-10-07 ENCOUNTER — EMERGENCY (EMERGENCY)
Facility: HOSPITAL | Age: 24
LOS: 1 days | Discharge: HOME | End: 2024-10-07
Attending: STUDENT IN AN ORGANIZED HEALTH CARE EDUCATION/TRAINING PROGRAM | Admitting: STUDENT IN AN ORGANIZED HEALTH CARE EDUCATION/TRAINING PROGRAM
Payer: MEDICAID

## 2024-10-07 VITALS
HEART RATE: 79 BPM | SYSTOLIC BLOOD PRESSURE: 99 MMHG | TEMPERATURE: 98 F | OXYGEN SATURATION: 98 % | RESPIRATION RATE: 18 BRPM | DIASTOLIC BLOOD PRESSURE: 68 MMHG

## 2024-10-07 VITALS
WEIGHT: 128.09 LBS | SYSTOLIC BLOOD PRESSURE: 105 MMHG | RESPIRATION RATE: 18 BRPM | DIASTOLIC BLOOD PRESSURE: 69 MMHG | TEMPERATURE: 98 F | HEART RATE: 77 BPM | HEIGHT: 62 IN | OXYGEN SATURATION: 98 %

## 2024-10-07 LAB
ALBUMIN SERPL ELPH-MCNC: 4 G/DL — SIGNIFICANT CHANGE UP (ref 3.3–5)
ALP SERPL-CCNC: 68 U/L — SIGNIFICANT CHANGE UP (ref 40–120)
ALT FLD-CCNC: 23 U/L — SIGNIFICANT CHANGE UP (ref 12–78)
ANION GAP SERPL CALC-SCNC: 7 MMOL/L — SIGNIFICANT CHANGE UP (ref 5–17)
APPEARANCE UR: CLEAR — SIGNIFICANT CHANGE UP
AST SERPL-CCNC: 17 U/L — SIGNIFICANT CHANGE UP (ref 15–37)
BASOPHILS # BLD AUTO: 0.03 K/UL — SIGNIFICANT CHANGE UP (ref 0–0.2)
BASOPHILS NFR BLD AUTO: 0.5 % — SIGNIFICANT CHANGE UP (ref 0–2)
BILIRUB SERPL-MCNC: 0.3 MG/DL — SIGNIFICANT CHANGE UP (ref 0.2–1.2)
BILIRUB UR-MCNC: NEGATIVE — SIGNIFICANT CHANGE UP
BUN SERPL-MCNC: 7 MG/DL — SIGNIFICANT CHANGE UP (ref 7–23)
CALCIUM SERPL-MCNC: 9.2 MG/DL — SIGNIFICANT CHANGE UP (ref 8.5–10.1)
CHLORIDE SERPL-SCNC: 106 MMOL/L — SIGNIFICANT CHANGE UP (ref 96–108)
CO2 SERPL-SCNC: 26 MMOL/L — SIGNIFICANT CHANGE UP (ref 22–31)
COLOR SPEC: YELLOW — SIGNIFICANT CHANGE UP
CREAT SERPL-MCNC: 0.63 MG/DL — SIGNIFICANT CHANGE UP (ref 0.5–1.3)
DIFF PNL FLD: NEGATIVE — SIGNIFICANT CHANGE UP
EGFR: 128 ML/MIN/1.73M2 — SIGNIFICANT CHANGE UP
EOSINOPHIL # BLD AUTO: 0.1 K/UL — SIGNIFICANT CHANGE UP (ref 0–0.5)
EOSINOPHIL NFR BLD AUTO: 1.5 % — SIGNIFICANT CHANGE UP (ref 0–6)
GLUCOSE SERPL-MCNC: 86 MG/DL — SIGNIFICANT CHANGE UP (ref 70–99)
GLUCOSE UR QL: NEGATIVE MG/DL — SIGNIFICANT CHANGE UP
HCG SERPL-ACNC: <1 MIU/ML — SIGNIFICANT CHANGE UP
HCG UR QL: NEGATIVE — SIGNIFICANT CHANGE UP
HCT VFR BLD CALC: 40.6 % — SIGNIFICANT CHANGE UP (ref 34.5–45)
HGB BLD-MCNC: 13.2 G/DL — SIGNIFICANT CHANGE UP (ref 11.5–15.5)
IMM GRANULOCYTES NFR BLD AUTO: 0.5 % — SIGNIFICANT CHANGE UP (ref 0–0.9)
KETONES UR-MCNC: NEGATIVE MG/DL — SIGNIFICANT CHANGE UP
LACTATE SERPL-SCNC: 0.8 MMOL/L — SIGNIFICANT CHANGE UP (ref 0.7–2)
LEUKOCYTE ESTERASE UR-ACNC: NEGATIVE — SIGNIFICANT CHANGE UP
LIDOCAIN IGE QN: 32 U/L — SIGNIFICANT CHANGE UP (ref 13–75)
LYMPHOCYTES # BLD AUTO: 2.72 K/UL — SIGNIFICANT CHANGE UP (ref 1–3.3)
LYMPHOCYTES # BLD AUTO: 41.7 % — SIGNIFICANT CHANGE UP (ref 13–44)
MCHC RBC-ENTMCNC: 27 PG — SIGNIFICANT CHANGE UP (ref 27–34)
MCHC RBC-ENTMCNC: 32.5 GM/DL — SIGNIFICANT CHANGE UP (ref 32–36)
MCV RBC AUTO: 83.2 FL — SIGNIFICANT CHANGE UP (ref 80–100)
MONOCYTES # BLD AUTO: 0.33 K/UL — SIGNIFICANT CHANGE UP (ref 0–0.9)
MONOCYTES NFR BLD AUTO: 5.1 % — SIGNIFICANT CHANGE UP (ref 2–14)
NEUTROPHILS # BLD AUTO: 3.32 K/UL — SIGNIFICANT CHANGE UP (ref 1.8–7.4)
NEUTROPHILS NFR BLD AUTO: 50.7 % — SIGNIFICANT CHANGE UP (ref 43–77)
NITRITE UR-MCNC: NEGATIVE — SIGNIFICANT CHANGE UP
NRBC # BLD: 0 /100 WBCS — SIGNIFICANT CHANGE UP (ref 0–0)
PH UR: 6.5 — SIGNIFICANT CHANGE UP (ref 5–8)
PLATELET # BLD AUTO: 408 K/UL — HIGH (ref 150–400)
POTASSIUM SERPL-MCNC: 3.9 MMOL/L — SIGNIFICANT CHANGE UP (ref 3.5–5.3)
POTASSIUM SERPL-SCNC: 3.9 MMOL/L — SIGNIFICANT CHANGE UP (ref 3.5–5.3)
PROT SERPL-MCNC: 8 G/DL — SIGNIFICANT CHANGE UP (ref 6–8.3)
PROT UR-MCNC: NEGATIVE MG/DL — SIGNIFICANT CHANGE UP
RAPID RVP RESULT: SIGNIFICANT CHANGE UP
RBC # BLD: 4.88 M/UL — SIGNIFICANT CHANGE UP (ref 3.8–5.2)
RBC # FLD: 12.1 % — SIGNIFICANT CHANGE UP (ref 10.3–14.5)
SARS-COV-2 RNA SPEC QL NAA+PROBE: SIGNIFICANT CHANGE UP
SODIUM SERPL-SCNC: 139 MMOL/L — SIGNIFICANT CHANGE UP (ref 135–145)
SP GR SPEC: 1.01 — SIGNIFICANT CHANGE UP (ref 1–1.03)
UROBILINOGEN FLD QL: 0.2 MG/DL — SIGNIFICANT CHANGE UP (ref 0.2–1)
WBC # BLD: 6.53 K/UL — SIGNIFICANT CHANGE UP (ref 3.8–10.5)
WBC # FLD AUTO: 6.53 K/UL — SIGNIFICANT CHANGE UP (ref 3.8–10.5)

## 2024-10-07 PROCEDURE — 81025 URINE PREGNANCY TEST: CPT

## 2024-10-07 PROCEDURE — 96374 THER/PROPH/DIAG INJ IV PUSH: CPT

## 2024-10-07 PROCEDURE — 84702 CHORIONIC GONADOTROPIN TEST: CPT

## 2024-10-07 PROCEDURE — 83605 ASSAY OF LACTIC ACID: CPT

## 2024-10-07 PROCEDURE — 76705 ECHO EXAM OF ABDOMEN: CPT | Mod: 26

## 2024-10-07 PROCEDURE — 99284 EMERGENCY DEPT VISIT MOD MDM: CPT

## 2024-10-07 PROCEDURE — 0225U NFCT DS DNA&RNA 21 SARSCOV2: CPT

## 2024-10-07 PROCEDURE — 85025 COMPLETE CBC W/AUTO DIFF WBC: CPT

## 2024-10-07 PROCEDURE — 71046 X-RAY EXAM CHEST 2 VIEWS: CPT | Mod: 26

## 2024-10-07 PROCEDURE — 36415 COLL VENOUS BLD VENIPUNCTURE: CPT

## 2024-10-07 PROCEDURE — 80053 COMPREHEN METABOLIC PANEL: CPT

## 2024-10-07 PROCEDURE — 76705 ECHO EXAM OF ABDOMEN: CPT

## 2024-10-07 PROCEDURE — 96375 TX/PRO/DX INJ NEW DRUG ADDON: CPT

## 2024-10-07 PROCEDURE — 81003 URINALYSIS AUTO W/O SCOPE: CPT

## 2024-10-07 PROCEDURE — 93010 ELECTROCARDIOGRAM REPORT: CPT

## 2024-10-07 PROCEDURE — 71046 X-RAY EXAM CHEST 2 VIEWS: CPT

## 2024-10-07 PROCEDURE — 93005 ELECTROCARDIOGRAM TRACING: CPT

## 2024-10-07 PROCEDURE — 83690 ASSAY OF LIPASE: CPT

## 2024-10-07 PROCEDURE — 99285 EMERGENCY DEPT VISIT HI MDM: CPT | Mod: 25

## 2024-10-07 RX ORDER — FAMOTIDINE 40 MG
20 TABLET ORAL ONCE
Refills: 0 | Status: COMPLETED | OUTPATIENT
Start: 2024-10-07 | End: 2024-10-07

## 2024-10-07 RX ORDER — ACETAMINOPHEN 325 MG
750 TABLET ORAL ONCE
Refills: 0 | Status: COMPLETED | OUTPATIENT
Start: 2024-10-07 | End: 2024-10-07

## 2024-10-07 RX ORDER — ONDANSETRON HCL/PF 4 MG/2 ML
4 VIAL (ML) INJECTION ONCE
Refills: 0 | Status: ACTIVE | OUTPATIENT
Start: 2024-10-07

## 2024-10-07 RX ORDER — MAG HYDROX/ALUMINUM HYD/SIMETH 200-200-20
10 SUSPENSION, ORAL (FINAL DOSE FORM) ORAL
Qty: 200 | Refills: 0
Start: 2024-10-07

## 2024-10-07 RX ORDER — ACETAMINOPHEN 325 MG
2 TABLET ORAL
Qty: 30 | Refills: 0
Start: 2024-10-07

## 2024-10-07 RX ORDER — FAMOTIDINE 40 MG
1 TABLET ORAL
Qty: 28 | Refills: 0
Start: 2024-10-07 | End: 2024-10-20

## 2024-10-07 RX ORDER — MAG HYDROX/ALUMINUM HYD/SIMETH 200-200-20
30 SUSPENSION, ORAL (FINAL DOSE FORM) ORAL ONCE
Refills: 0 | Status: COMPLETED | OUTPATIENT
Start: 2024-10-07 | End: 2024-10-07

## 2024-10-07 RX ORDER — SODIUM CHLORIDE 0.9 % (FLUSH) 0.9 %
1000 SYRINGE (ML) INJECTION ONCE
Refills: 0 | Status: COMPLETED | OUTPATIENT
Start: 2024-10-07 | End: 2024-10-07

## 2024-10-07 RX ADMIN — Medication 1000 MILLILITER(S): at 18:24

## 2024-10-07 RX ADMIN — Medication 30 MILLILITER(S): at 18:22

## 2024-10-07 RX ADMIN — Medication 20 MILLIGRAM(S): at 18:22

## 2024-10-07 RX ADMIN — Medication 300 MILLIGRAM(S): at 18:44

## 2024-10-07 NOTE — ED ADULT NURSE REASSESSMENT NOTE - NS ED NURSE REASSESS COMMENT FT1
received pt. from previous RN - yuniel, pt. is on bed, for ultrasound, awaiting to be transfer to [facility] department, stable upon assessment, alert and oriented, safety maintained and applied.

## 2024-10-07 NOTE — ED PROVIDER NOTE - NSFOLLOWUPINSTRUCTIONS_ED_ALL_ED_FT
Please follow up with you PCP in the next 2 days  Please follow up with GI within the next 2 days    Please take medications as prescribed.    Return to the Emergency Department for worsening or persistent symptoms, and/or ANY NEW OR CONCERNING SYMPTOMS. If you have issues obtaining follow up, please call: [phone] or [phone]  to obtain a doctor or specialist who takes your insurance in your area.    Please return to the ED for any new or worsening problems including but not limited to: fever, chills, headache, chest pain, shortness of breath, palpitations, abdominal pain, nausea, vomiting, diarrhea, numbness or weakness.

## 2024-10-07 NOTE — ED PROVIDER NOTE - CARE PROVIDER_API CALL
Rivera Steve  Internal Medicine  [address] [place] [address]  Phone: [phone]  Fax: [phone]  Follow Up Time: 1-3 Days    Renny Ferrara  Gastroenterology  [address] [place] [id number]  Phone: [phone]  Fax: [phone]  Follow Up Time: 1-3 Days

## 2024-10-07 NOTE — ED PROVIDER NOTE - PATIENT PORTAL LINK FT
You can access the FollowMyHealth Patient Portal offered by [facility] by registering at the following website: [email or web address]. By joining [employer]’s FollowMyHealth portal, you will also be able to view your health information using other applications (apps) compatible with our system.

## 2024-10-07 NOTE — ED PROVIDER NOTE - PHYSICAL EXAMINATION
General: well appearing, no acute distress, appropriate weight  Cardiac: heart RRR, no murmurs, rubs, gallops, pulses 2+ x4  Pulm: lungs CTA  GI: abdomen soft, periumbilical TTP. negative lowe's, McBurney's, rovsings, rebound, CVA tenderness  Skin: warm, dry, no rash, laceration, abrasion, contusion

## 2024-10-07 NOTE — ED PROVIDER NOTE - PROVIDER TOKENS
PROVIDER:[TOKEN:[[id number]],FOLLOWUP:[1-3 Days]],PROVIDER:[TOKEN:[[id number]],FOLLOWUP:[1-3 Days]]

## 2024-10-07 NOTE — ED PROVIDER NOTE - DIFFERENTIAL DIAGNOSIS
DDx includes but limited to: Gastritis VS pancreatitis VS PUD VS cholecystitis Differential Diagnosis

## 2024-10-07 NOTE — ED ADULT NURSE REASSESSMENT NOTE - NSFALLUNIVINTERV_ED_ALL_ED
Bed/Stretcher in lowest position, wheels locked, appropriate side rails in place/Call bell, personal items and telephone in reach/Instruct patient to call for assistance before getting out of bed/chair/stretcher/Non-slip footwear applied when patient is off stretcher/[place] to call system/Physically safe environment - no spills, clutter or unnecessary equipment/Purposeful proactive rounding/Room/bathroom lighting operational, light cord in reach

## 2024-10-07 NOTE — ED ADULT TRIAGE NOTE - CHIEF COMPLAINT QUOTE
Chest congestion, cough, headaches, body aches and pain and abd pain. Pt dx with RLL pneumonia last wee Monday RX Amox and Zpack, cough med  in which she completed and now symptoms persist

## 2024-10-07 NOTE — ED PROVIDER NOTE - PROGRESS NOTE DETAILS
Patient reevaluated, feeling better.  Labs and imaging unremarkable.  Patient has tolerated p.o.  Abdomen remains soft and nontender.  Patient will like to go home.  Will discharge patient home with follow-up to PCP, GI give strict return precautions.

## 2025-02-10 ENCOUNTER — APPOINTMENT (OUTPATIENT)
Age: 25
End: 2025-02-10
Payer: MEDICAID

## 2025-02-10 VITALS
SYSTOLIC BLOOD PRESSURE: 110 MMHG | HEIGHT: 62 IN | DIASTOLIC BLOOD PRESSURE: 80 MMHG | WEIGHT: 125 LBS | BODY MASS INDEX: 23 KG/M2 | HEART RATE: 88 BPM

## 2025-02-10 DIAGNOSIS — K21.9 GASTRO-ESOPHAGEAL REFLUX DISEASE W/OUT ESOPHAGITIS: ICD-10-CM

## 2025-02-10 DIAGNOSIS — J34.3 HYPERTROPHY OF NASAL TURBINATES: ICD-10-CM

## 2025-02-10 DIAGNOSIS — Z86.19 PERSONAL HISTORY OF OTHER INFECTIOUS AND PARASITIC DISEASES: ICD-10-CM

## 2025-02-10 DIAGNOSIS — Z87.19 PERSONAL HISTORY OF OTHER DISEASES OF THE DIGESTIVE SYSTEM: ICD-10-CM

## 2025-02-10 DIAGNOSIS — J45.40 MODERATE PERSISTENT ASTHMA, UNCOMPLICATED: ICD-10-CM

## 2025-02-10 DIAGNOSIS — N83.202 UNSPECIFIED OVARIAN CYST, RIGHT SIDE: ICD-10-CM

## 2025-02-10 DIAGNOSIS — J30.89 OTHER ALLERGIC RHINITIS: ICD-10-CM

## 2025-02-10 DIAGNOSIS — Z87.09 PERSONAL HISTORY OF OTHER DISEASES OF THE RESPIRATORY SYSTEM: ICD-10-CM

## 2025-02-10 DIAGNOSIS — K58.2 MIXED IRRITABLE BOWEL SYNDROME: ICD-10-CM

## 2025-02-10 DIAGNOSIS — R49.0 DYSPHONIA: ICD-10-CM

## 2025-02-10 DIAGNOSIS — N83.201 UNSPECIFIED OVARIAN CYST, RIGHT SIDE: ICD-10-CM

## 2025-02-10 DIAGNOSIS — J32.9 CHRONIC SINUSITIS, UNSPECIFIED: ICD-10-CM

## 2025-02-10 DIAGNOSIS — R10.9 UNSPECIFIED ABDOMINAL PAIN: ICD-10-CM

## 2025-02-10 DIAGNOSIS — Z92.89 PERSONAL HISTORY OF OTHER MEDICAL TREATMENT: ICD-10-CM

## 2025-02-10 DIAGNOSIS — J34.829 NASAL VALVE COLLAPSE, UNSPECIFIED: ICD-10-CM

## 2025-02-10 DIAGNOSIS — Z87.898 PERSONAL HISTORY OF OTHER SPECIFIED CONDITIONS: ICD-10-CM

## 2025-02-10 PROCEDURE — 99204 OFFICE O/P NEW MOD 45 MIN: CPT

## 2025-02-10 RX ORDER — IBUPROFEN 600 MG/1
600 TABLET, FILM COATED ORAL EVERY 6 HOURS
Qty: 120 | Refills: 0 | Status: ACTIVE | COMMUNITY
Start: 2025-02-10

## 2025-02-21 ENCOUNTER — APPOINTMENT (OUTPATIENT)
Dept: MRI IMAGING | Facility: CLINIC | Age: 25
End: 2025-02-21

## 2025-02-24 ENCOUNTER — APPOINTMENT (OUTPATIENT)
Age: 25
End: 2025-02-24

## 2025-03-26 ENCOUNTER — APPOINTMENT (OUTPATIENT)
Dept: MRI IMAGING | Facility: CLINIC | Age: 25
End: 2025-03-26
Payer: MEDICAID

## 2025-03-26 PROCEDURE — A9585: CPT

## 2025-03-26 PROCEDURE — 72197 MRI PELVIS W/O & W/DYE: CPT

## 2025-04-04 DIAGNOSIS — N83.201 UNSPECIFIED OVARIAN CYST, RIGHT SIDE: ICD-10-CM

## 2025-04-04 DIAGNOSIS — N83.202 UNSPECIFIED OVARIAN CYST, RIGHT SIDE: ICD-10-CM

## 2025-04-04 DIAGNOSIS — Q51.9 CONGENITAL MALFORMATION OF UTERUS AND CERVIX, UNSPECIFIED: ICD-10-CM

## 2025-04-04 DIAGNOSIS — R10.9 UNSPECIFIED ABDOMINAL PAIN: ICD-10-CM

## 2025-04-16 ENCOUNTER — APPOINTMENT (OUTPATIENT)
Age: 25
End: 2025-04-16

## 2025-06-05 ENCOUNTER — NON-APPOINTMENT (OUTPATIENT)
Age: 25
End: 2025-06-05